# Patient Record
Sex: MALE
[De-identification: names, ages, dates, MRNs, and addresses within clinical notes are randomized per-mention and may not be internally consistent; named-entity substitution may affect disease eponyms.]

---

## 2020-10-09 ENCOUNTER — HOSPITAL ENCOUNTER (OUTPATIENT)
Dept: HOSPITAL 8 - CFH | Age: 75
Discharge: HOME | End: 2020-10-09
Attending: INTERNAL MEDICINE
Payer: MEDICARE

## 2020-10-09 DIAGNOSIS — Z90.49: ICD-10-CM

## 2020-10-09 DIAGNOSIS — D72.819: ICD-10-CM

## 2020-10-09 DIAGNOSIS — K76.89: Primary | ICD-10-CM

## 2020-10-09 PROCEDURE — 76700 US EXAM ABDOM COMPLETE: CPT

## 2021-09-21 ENCOUNTER — OFFICE VISIT (OUTPATIENT)
Dept: URGENT CARE | Facility: CLINIC | Age: 76
End: 2021-09-21
Payer: MEDICARE

## 2021-09-21 VITALS
BODY MASS INDEX: 27.47 KG/M2 | HEART RATE: 64 BPM | DIASTOLIC BLOOD PRESSURE: 90 MMHG | TEMPERATURE: 98.6 F | RESPIRATION RATE: 16 BRPM | OXYGEN SATURATION: 98 % | WEIGHT: 175 LBS | HEIGHT: 67 IN | SYSTOLIC BLOOD PRESSURE: 128 MMHG

## 2021-09-21 DIAGNOSIS — K29.70 GASTRITIS WITHOUT BLEEDING, UNSPECIFIED CHRONICITY, UNSPECIFIED GASTRITIS TYPE: ICD-10-CM

## 2021-09-21 PROBLEM — Z85.46 HISTORY OF MALIGNANT NEOPLASM OF PROSTATE: Status: ACTIVE | Noted: 2018-02-14

## 2021-09-21 PROBLEM — K21.9 GASTROESOPHAGEAL REFLUX DISEASE: Status: ACTIVE | Noted: 2021-09-21

## 2021-09-21 PROBLEM — R97.20 RAISED PROSTATE SPECIFIC ANTIGEN: Status: ACTIVE | Noted: 2018-02-14

## 2021-09-21 PROBLEM — C61 MALIGNANT TUMOR OF PROSTATE (HCC): Status: ACTIVE | Noted: 2018-11-08

## 2021-09-21 PROBLEM — H91.90 HEARING LOSS: Status: ACTIVE | Noted: 2021-09-21

## 2021-09-21 PROBLEM — M19.90 ARTHRITIS: Status: ACTIVE | Noted: 2021-09-21

## 2021-09-21 PROCEDURE — 99213 OFFICE O/P EST LOW 20 MIN: CPT | Performed by: NURSE PRACTITIONER

## 2021-09-21 RX ORDER — SUCRALFATE 1 G/1
TABLET ORAL
COMMUNITY
Start: 2021-09-11 | End: 2021-09-21

## 2021-09-21 RX ORDER — ATORVASTATIN CALCIUM 20 MG/1
TABLET, FILM COATED ORAL
COMMUNITY

## 2021-09-21 RX ORDER — SUCRALFATE ORAL 1 G/10ML
1 SUSPENSION ORAL 4 TIMES DAILY
Qty: 1200 ML | Refills: 1 | Status: SHIPPED | OUTPATIENT
Start: 2021-09-21 | End: 2021-11-20

## 2021-09-21 RX ORDER — OMEPRAZOLE 10 MG/1
CAPSULE, DELAYED RELEASE ORAL
COMMUNITY

## 2021-09-21 RX ORDER — FAMOTIDINE 40 MG/1
40 TABLET, FILM COATED ORAL DAILY
Qty: 30 TABLET | Refills: 1 | Status: SHIPPED | OUTPATIENT
Start: 2021-09-21 | End: 2021-11-20

## 2021-09-21 RX ORDER — ASPIRIN 81 MG/1
TABLET, CHEWABLE ORAL
COMMUNITY

## 2021-09-21 RX ORDER — TAMSULOSIN HYDROCHLORIDE 0.4 MG/1
CAPSULE ORAL
COMMUNITY

## 2021-09-21 RX ORDER — FAMOTIDINE 40 MG/1
TABLET, FILM COATED ORAL
COMMUNITY
Start: 2021-09-11 | End: 2021-09-21

## 2021-09-21 ASSESSMENT — ENCOUNTER SYMPTOMS
SPUTUM PRODUCTION: 0
HEMOPTYSIS: 0
DIARRHEA: 0
HEARTBURN: 1
BLOOD IN STOOL: 0
SHORTNESS OF BREATH: 0
PALPITATIONS: 0
CHILLS: 0
ABDOMINAL PAIN: 1
DIAPHORESIS: 0
FEVER: 0
NAUSEA: 0
ORTHOPNEA: 0
WEAKNESS: 0
WHEEZING: 0
VOMITING: 0
CONSTIPATION: 0
COUGH: 0

## 2021-09-21 NOTE — PROGRESS NOTES
"Subjective     Pk Man is a 75 y.o. male who presents with Abdominal Pain (Possible ulcer, Pt was seen in ED for acute abdominal pain, Given Rx for Sucralfate, and Famotidine. Pt has since run out of medication, pain has returned. )            Pk comes in today with his daughter.  He reports epigastric pain and heartburn.  He has had heartburn for years but recently developed epigastric pain.  He was evaluated at [facility] ED with a full work up including CBC, CMP, troponin, EKG, chest x-ray and abdominal CT.  Ultimately he was diagnosed with gastritis, prescribed carafate and pepcid and referred to gastroenterology.  He reports that his symptoms resolved while taking the carafate and pepcid but he ran out 4 days ago and since then the symptoms have returned.  He denies any new or worsening symptoms.  NO fever, chills, URI symptoms, chest pain, shortness of breath, nausea, vomiting, diarrhea, bloody or tarry stools.  He is scheduled to establish with GI in November.  He denies any previously diagnosed peptic ulcer disease or H pylori.  No history of GI bleed or diverticulitis.  He had a colonoscopy 8 years ago with several polyps removed.  He also had radiation for prostate cancer in 2019.        Review of Systems   Constitutional: Negative for chills, diaphoresis, fever and malaise/fatigue.   Respiratory: Negative for cough, hemoptysis, sputum production, shortness of breath and wheezing.    Cardiovascular: Negative for chest pain, palpitations, orthopnea and leg swelling.   Gastrointestinal: Positive for abdominal pain and heartburn. Negative for blood in stool, constipation, diarrhea, melena, nausea and vomiting.   Genitourinary: Negative for dysuria.   Neurological: Negative for weakness.     Medications, Allergies, and current problem list reviewed today in Epic         Objective     Blood Pressure 128/90   Pulse 64   Temperature 37 °C (98.6 °F) (Temporal)   Respiration 16   Height 1.702 m (5' 7\")   " Weight 79.4 kg (175 lb)   Oxygen Saturation 98%   Body Mass Index 27.41 kg/m²      Physical Exam  Vitals reviewed.   Constitutional:       General: He is not in acute distress.     Appearance: Normal appearance. He is well-developed. He is not ill-appearing, toxic-appearing or diaphoretic.      Comments: Hard of hearing.   HENT:      Nose: Nose normal.      Mouth/Throat:      Mouth: Mucous membranes are moist.   Eyes:      General: No scleral icterus.        Right eye: No discharge.         Left eye: No discharge.      Conjunctiva/sclera: Conjunctivae normal.      Pupils: Pupils are equal, round, and reactive to light.   Neck:      Thyroid: No thyromegaly.      Vascular: No JVD.      Trachea: No tracheal deviation.   Cardiovascular:      Rate and Rhythm: Normal rate and regular rhythm.      Heart sounds: Normal heart sounds. No murmur heard.   No friction rub. No gallop.    Pulmonary:      Effort: Pulmonary effort is normal. No respiratory distress.      Breath sounds: Normal breath sounds. No stridor. No wheezing, rhonchi or rales.   Chest:      Chest wall: No tenderness.   Abdominal:      General: Bowel sounds are normal.      Palpations: Abdomen is soft.      Tenderness: There is abdominal tenderness in the epigastric area. There is no right CVA tenderness, left CVA tenderness, guarding or rebound. Negative signs include Foy's sign and McBurney's sign.   Musculoskeletal:      Cervical back: Neck supple.   Lymphadenopathy:      Cervical: No cervical adenopathy.   Skin:     General: Skin is warm and dry.      Capillary Refill: Capillary refill takes less than 2 seconds.      Coloration: Skin is not jaundiced or pale.      Findings: No erythema.   Neurological:      Mental Status: He is alert and oriented to person, place, and time.      Motor: No weakness.   Psychiatric:         Mood and Affect: Mood normal.                             Assessment & Plan        1. Gastritis without bleeding, unspecified  chronicity, unspecified gastritis type  - sucralfate (CARAFATE) 1 GM/10ML Suspension; Take 10 mL by mouth 4 times a day for 60 days.  Dispense: 1200 mL; Refill: 1  - famotidine (PEPCID) 40 MG Tab; Take 1 Tablet by mouth every day for 60 days.  Dispense: 30 Tablet; Refill: 1    Discussed exam findings with Pk and his daughter.  Differential reviewed.  Carafate and pepcid as prescribed.  He requests liquid carafate due to difficulty swallowing large pills.  Maintain adequate hydration.  Follow up with GI as scheduled or to go ED sooner if worse.  He verbalized understanding of and agreed with plan of care.

## 2022-11-04 ENCOUNTER — PATIENT MESSAGE (OUTPATIENT)
Dept: HEALTH INFORMATION MANAGEMENT | Facility: OTHER | Age: 77
End: 2022-11-04